# Patient Record
Sex: FEMALE | Race: WHITE | URBAN - METROPOLITAN AREA
[De-identification: names, ages, dates, MRNs, and addresses within clinical notes are randomized per-mention and may not be internally consistent; named-entity substitution may affect disease eponyms.]

---

## 2018-05-03 PROBLEM — Z91.199 GENERAL PATIENT NONCOMPLIANCE: Status: ACTIVE | Noted: 2018-05-03

## 2018-05-03 PROBLEM — G47.61 PERIODIC LIMB MOVEMENT DISORDER: Status: ACTIVE | Noted: 2018-05-03

## 2018-05-03 PROBLEM — I10 ESSENTIAL HYPERTENSION: Status: ACTIVE | Noted: 2018-05-03

## 2018-05-03 PROBLEM — K21.9 GASTROESOPHAGEAL REFLUX DISEASE: Status: ACTIVE | Noted: 2018-05-03

## 2018-05-03 PROBLEM — E78.5 HYPERLIPIDEMIA: Status: ACTIVE | Noted: 2018-05-03

## 2018-05-03 PROBLEM — F41.1 ANXIETY STATE: Status: ACTIVE | Noted: 2018-05-03

## 2018-05-03 PROBLEM — F33.1 RECURRENT MAJOR DEPRESSIVE EPISODES, MODERATE (HCC): Status: ACTIVE | Noted: 2018-05-03

## 2018-05-03 PROBLEM — D69.6 THROMBOCYTOPENIA (HCC): Status: ACTIVE | Noted: 2018-05-03

## 2018-05-03 PROBLEM — G47.33 OBSTRUCTIVE SLEEP APNEA SYNDROME: Status: ACTIVE | Noted: 2018-05-03

## 2018-05-03 PROBLEM — E66.9 OBESITY: Status: ACTIVE | Noted: 2018-05-03

## 2019-05-17 ENCOUNTER — TELEPHONE (OUTPATIENT)
Dept: FAMILY MEDICINE CLINIC | Age: 74
End: 2019-05-17

## 2019-05-17 NOTE — TELEPHONE ENCOUNTER
Patient requesting Tolterodine 4mg ER script be sent to [facility] on [address] [place] [facility][phone] Per the patient Linnea Later is too expensive. With any questions or concerns the patient can be reached at p#[phone] Thank you!